# Patient Record
Sex: FEMALE | Race: WHITE
[De-identification: names, ages, dates, MRNs, and addresses within clinical notes are randomized per-mention and may not be internally consistent; named-entity substitution may affect disease eponyms.]

---

## 2021-10-14 ENCOUNTER — HOSPITAL ENCOUNTER (INPATIENT)
Dept: HOSPITAL 77 - KA.ED | Age: 51
LOS: 5 days | Discharge: HOME | DRG: 137 | End: 2021-10-19
Attending: INTERNAL MEDICINE | Admitting: PHYSICIAN ASSISTANT
Payer: COMMERCIAL

## 2021-10-14 DIAGNOSIS — U07.1: Primary | ICD-10-CM

## 2021-10-14 DIAGNOSIS — H54.7: ICD-10-CM

## 2021-10-14 DIAGNOSIS — Z90.49: ICD-10-CM

## 2021-10-14 DIAGNOSIS — J12.82: ICD-10-CM

## 2021-10-14 DIAGNOSIS — B17.8: ICD-10-CM

## 2021-10-14 DIAGNOSIS — R79.89: ICD-10-CM

## 2021-10-14 LAB
ANION GAP SERPL CALC-SCNC: 16.4 MMOL/L (ref 5–15)
CHLORIDE SERPL-SCNC: 99 MMOL/L (ref 98–107)
SODIUM SERPL-SCNC: 139 MMOL/L (ref 136–145)

## 2021-10-14 PROCEDURE — XW033E5 INTRODUCTION OF REMDESIVIR ANTI-INFECTIVE INTO PERIPHERAL VEIN, PERCUTANEOUS APPROACH, NEW TECHNOLOGY GROUP 5: ICD-10-PCS | Performed by: INTERNAL MEDICINE

## 2021-10-14 PROCEDURE — 8E0ZXY6 ISOLATION: ICD-10-PCS | Performed by: INTERNAL MEDICINE

## 2021-10-14 PROCEDURE — 3E0333Z INTRODUCTION OF ANTI-INFLAMMATORY INTO PERIPHERAL VEIN, PERCUTANEOUS APPROACH: ICD-10-PCS | Performed by: INTERNAL MEDICINE

## 2021-10-14 RX ADMIN — Medication SCH MG: at 17:17

## 2021-10-14 RX ADMIN — VITAMIN D, TAB 1000IU (100/BT) SCH MCG: 25 TAB at 17:17

## 2021-10-14 NOTE — CT
______________________________________________________________________________   

  

[id number] CT/CTA Chest  

EXAM: CT ANGIOGRAM CHEST  

   

 INDICATION: DYSPNEA.  

   

 COMPARISON: None.  

   

 DISCUSSION:   

 The pulmonary arteries are normal in appearance with no emboli identified.  

   

 There are extensive bilateral peripheral predominant groundglass opacities.  

 These are nonspecific, but could be seen in the context of infection including  

 COVID pneumonia. Scattered noncalcified pulmonary nodules the largest of which  

 is about 7 mm left upper lobe image 16 series 3.No pleural or pericardial  

 effusion. Normal heart size. No mediastinal, hilar or axillary lymphadenopathy.  

   

 Cholecystectomy.  

   

 IMPRESSION:    

 1.  Extensive bilateral groundglass infiltrates.  

 2.  Negative for pulmonary emboli.  

   

 Electronically signed by Derek Rainey MD on 10/14/2021 3:54 PM  

   

  

Derek Rainey MD                 

 10/14/21 [id number]    

  

Thank you for allowing us to participate in the care of your patient.

## 2021-10-14 NOTE — EDM.PDOC
ED HPI GENERAL MEDICAL PROBLEM





- General


Chief Complaint: General


Stated Complaint: COVID


Time Seen by Provider: 10/14/21 13:38


Source of Information: Reports: Patient


History Limitations: Reports: No Limitations





- History of Present Illness


INITIAL COMMENTS - FREE TEXT/NARRATIVE: 





50 YO WF PRESENTS TO ER COMPLAINING OF SHORTNESS OF BREATH WITH FEVER/CHILLS, 

BODY ACHES AND GENERALIZED WEAKNESS. PT REPORTS SHE BEGAN HAVING SYMPTOMS 

10/2/2021 AND WAS DIAGNOSED WITH COVID BY ANTIGEN TEST ON 10/5/2021. PT DENIES 

GETTING VACCINATED OR RECEIVING MONOCLONAL ANTIBODIES AS AN OUTPATIENT. PT 

REPORTS INTERMITTENT EPISODES OF N/V/D. PT STATES SHE FEELS WORSE OVER THE LAST 

COUPLE OF DAYS. PT HAD A TELEMEDICINE APPOINTMENT TODAY AN WAS INSTRUCTED TO 

COME TO ER FOR IVF THERAPY. SAO2 ON ARRIVAL WAS 88% RA,  


Onset Date: 10/02/21


Duration: Getting Worse


Location: Reports: Generalized


Quality: Reports: Ache


Severity: Moderate


Improves with: Reports: Medication


Worsens with: Reports: Movement


Associated Symptoms: Reports: No Other Symptoms, Cough, Fever/Chills, 

Nausea/Vomiting, Shortness of Breath, Weakness.  Denies: Chest Pain


  ** Face/Facial


Pain Score (Numeric/FACES): 10





- Related Data


                                    Allergies











Allergy/AdvReac Type Severity Reaction Status Date / Time


 


No Known Allergies Allergy   Verified 10/14/21 15:18











Home Meds: 


                                    Home Meds





Famotidine 20 mg PO DAILY 10/14/21 [History]











Past Medical History


HEENT History: Reports: Epistaxis, Impaired Vision, Sinusitis


Gastrointestinal History: Reports: None


Genitourinary History: Reports: None


OB/GYN History: Reports: Pregnancy


Other OB/GYN History: changing menses - feels is perimenopausal


Musculoskeletal History: Reports: Fracture


Other Musculoskeletal History: L ankle


Hematologic History: Reports: Anemia





- Infectious Disease History


Infectious Disease History: Reports: Chicken Pox





- Past Surgical History


HEENT Surgical History: Reports: Oral Surgery


GI Surgical History: Reports: Cholecystectomy


Other GI Surgeries/Procedures: 2008


Female  Surgical History: Reports:  Section





Social & Family History





- Tobacco Use


Tobacco Use Status *Q: Never Tobacco User





- Caffeine Use


Caffeine Use: Reports: None





- Recreational Drug Use


Recreational Drug Use: No





ED ROS GENERAL





- Review of Systems


Review Of Systems: See Below


Constitutional: Reports: Fever, Chills, Malaise, Weakness


HEENT: Reports: Rhinitis, Throat Pain


Respiratory: Reports: Shortness of Breath, Cough


Cardiovascular: Reports: No Symptoms


Endocrine: Reports: No Symptoms


GI/Abdominal: Reports: Diarrhea, Nausea, Vomiting.  Denies: Abdominal Pain


: Reports: No Symptoms


Musculoskeletal: Reports: Muscle Pain


Skin: Reports: No Symptoms


Neurological: Reports: No Symptoms


Psychiatric: Reports: No Symptoms


Hematologic/Lymphatic: Reports: No Symptoms


Immunologic: Reports: No Symptoms





ED EXAM, GENERAL





- Physical Exam


Exam: See Below


Exam Limited By: No Limitations


General Appearance: Alert, WD/WN, No Apparent Distress


Ears: Normal External Exam, Normal Canal, Hearing Grossly Normal, Normal TMs


Ear Exam: Bilateral Ear: Auricle Normal, Canal Normal, TM normal


Nose: Clear Rhinorrhea


Throat/Mouth: Normal Inspection, Normal Lips, Normal Teeth, Normal Gums, Normal 

Oropharynx, Normal Voice, No Airway Compromise


Head: Atraumatic, Normocephalic


Neck: Normal Inspection, Supple, Full Range of Motion, Lymphadenopathy (L), 

Lymphadenopathy (R), Tender Lateral


Respiratory/Chest: No Respiratory Distress, Lungs Clear, Normal Breath Sounds, 

No Accessory Muscle Use, Chest Non-Tender, Decreased Breath Sounds


Cardiovascular: Normal Peripheral Pulses, Regular Rate, Rhythm, No Edema, No 

Gallop, No JVD, No Murmur, No Rub


GI/Abdominal: Normal Bowel Sounds, Soft, Non-Tender, No Organomegaly, No 

Distention, No Abnormal Bruit, No Mass


Back Exam: Normal Inspection, Full Range of Motion, NT


Extremities: Normal Inspection, Normal Range of Motion, Non-Tender, Normal 

Capillary Refill, No Pedal Edema


Neurological: Alert, Oriented, CN II-XII Intact, Normal Cognition, Normal Gait, 

No Motor/Sensory Deficits


Psychiatric: Normal Affect, Normal Mood


Skin Exam: Warm, Dry, Intact, Normal Color, No Rash





Course





- Vital Signs


Last Recorded V/S: 


                                Last Vital Signs











Temp  97.5 F   10/14/21 15:18


 


Pulse  99   10/14/21 15:39


 


Resp  24 H  10/14/21 15:39


 


BP  123/85   10/14/21 15:39


 


Pulse Ox  92 L  10/14/21 15:39














- Orders/Labs/Meds


Orders: 


                               Active Orders 24 hr











 Category Date Time Status


 


 LIPASE [REF] Stat Lab  10/14/21 13:13 Ordered


 


 Sodium Chloride 0.9% [Normal Saline] 100 ml Med  10/14/21 15:00 Active





 IV ASDIRECTED   








                                Medication Orders





Sodium Chloride (Normal Saline)  100 mls @ 200 mls/hr IV ASDIRECTED EUGENE


   Last Admin: 10/14/21 15:42  Dose: 200 mls/hr


   Documented by: STEFANIA








Labs: 


                                Laboratory Tests











  10/14/21 10/14/21 10/14/21 Range/Units





  13:33 13:33 13:33 


 


WBC  7.34    (5.00-10.00)  10^3/uL


 


RBC  5.37    (3.80-5.50)  10^6/uL


 


Hgb  15.2    (12.0-16.0)  g/dL


 


Hct  47.1 H    (37.0-47.0)  %


 


MCV  87.7  D    (82.0-92.0)  fL


 


MCH  28.3    (27.0-31.0)  pg


 


MCHC  32.3    (32.0-36.0)  g/dL


 


RDW  14.4    (11.5-14.5)  %


 


Plt Count  227    (150-400)  10^3/uL


 


MPV  9.5    (7.4-10.4)  fL


 


Immature Gran % (Auto)  2.0    (0.0-5.0)  %


 


Neut % (Auto)  84.8 H    (50.0-70.0)  %


 


Lymph % (Auto)  7.8 L    (20.0-40.0)  %


 


Mono % (Auto)  5.4    (2.0-8.0)  %


 


Eos % (Auto)  0.0 L    (1.0-3.0)  %


 


Baso % (Auto)  0.0    (0.0-1.0)  %


 


Neut # (Auto)  6.22    (2.50-7.00)  10^3/uL


 


Lymph # (Auto)  0.57 L    (1.00-4.00)  10^3/uL


 


Mono # (Auto)  0.40    (0.10-0.80)  10^3/uL


 


Eos # (Auto)  0.00 L    (0.10-0.30)  10^3/uL


 


Baso # (Auto)  0.00    (0.00-0.10)  10^3/uL


 


Immature Gran # (Auto)  0.15    (0.00-0.50)  10^3/uL


 


D-Dimer, Quantitative     (<400)  ng/mL


 


Sodium   139   (136-145)  mmol/L


 


Potassium   3.4 L   (3.5-5.1)  mmol/L


 


Chloride   99   ()  mmol/L


 


Carbon Dioxide   27.0   (21.0-32.0)  mmol/L


 


Anion Gap   16.4 H   (5-15)  mmol/L


 


BUN   20 H   (7-18)  mg/dL


 


Creatinine   0.76   (0.51-1.17)  mg/dL


 


Est Cr Clr Drug Dosing   77.92   mL/min


 


Estimated GFR (MDRD)   > 60   mL/min


 


Glucose   99   ()  mg/dL


 


Lactic Acid    1.3  (0.4-2.0)  mmol/L


 


Calcium   8.4 L   (8.7-10.3)  mg/dL


 


Total Bilirubin   0.9   (0.2-1.0)  mg/dL


 


AST   119 H   (15-37)  U/L


 


ALT   118 H   (14-63)  U/L


 


Alkaline Phosphatase   210 H   ()  U/L


 


Total Protein   7.3   (6.4-8.2)  g/dL


 


Albumin   2.62 L   (3.40-5.00)  g/dL














  10/14/21 Range/Units





  13:33 


 


WBC   (5.00-10.00)  10^3/uL


 


RBC   (3.80-5.50)  10^6/uL


 


Hgb   (12.0-16.0)  g/dL


 


Hct   (37.0-47.0)  %


 


MCV   (82.0-92.0)  fL


 


MCH   (27.0-31.0)  pg


 


MCHC   (32.0-36.0)  g/dL


 


RDW   (11.5-14.5)  %


 


Plt Count   (150-400)  10^3/uL


 


MPV   (7.4-10.4)  fL


 


Immature Gran % (Auto)   (0.0-5.0)  %


 


Neut % (Auto)   (50.0-70.0)  %


 


Lymph % (Auto)   (20.0-40.0)  %


 


Mono % (Auto)   (2.0-8.0)  %


 


Eos % (Auto)   (1.0-3.0)  %


 


Baso % (Auto)   (0.0-1.0)  %


 


Neut # (Auto)   (2.50-7.00)  10^3/uL


 


Lymph # (Auto)   (1.00-4.00)  10^3/uL


 


Mono # (Auto)   (0.10-0.80)  10^3/uL


 


Eos # (Auto)   (0.10-0.30)  10^3/uL


 


Baso # (Auto)   (0.00-0.10)  10^3/uL


 


Immature Gran # (Auto)   (0.00-0.50)  10^3/uL


 


D-Dimer, Quantitative  1200 H  (<400)  ng/mL


 


Sodium   (136-145)  mmol/L


 


Potassium   (3.5-5.1)  mmol/L


 


Chloride   ()  mmol/L


 


Carbon Dioxide   (21.0-32.0)  mmol/L


 


Anion Gap   (5-15)  mmol/L


 


BUN   (7-18)  mg/dL


 


Creatinine   (0.51-1.17)  mg/dL


 


Est Cr Clr Drug Dosing   mL/min


 


Estimated GFR (MDRD)   mL/min


 


Glucose   ()  mg/dL


 


Lactic Acid   (0.4-2.0)  mmol/L


 


Calcium   (8.7-10.3)  mg/dL


 


Total Bilirubin   (0.2-1.0)  mg/dL


 


AST   (15-37)  U/L


 


ALT   (14-63)  U/L


 


Alkaline Phosphatase   ()  U/L


 


Total Protein   (6.4-8.2)  g/dL


 


Albumin   (3.40-5.00)  g/dL











Meds: 


Medications











Generic Name Dose Route Start Last Admin





  Trade Name Freq  PRN Reason Stop Dose Admin


 


Sodium Chloride  100 mls @ 200 mls/hr  10/14/21 15:00  10/14/21 15:42





  Normal Saline  IV   200 mls/hr





  ASDIRECTED EUGENE   Administration














Discontinued Medications














Generic Name Dose Route Start Last Admin





  Trade Name Freq  PRN Reason Stop Dose Admin


 


Dexamethasone  6 mg  10/14/21 13:14  10/14/21 13:48





  Dexamethasone 4 Mg/Ml Sdv  IVPUSH  10/14/21 13:15  6 mg





  ONETIME ONE   Administration


 


Sodium Chloride  1,000 mls @ 999 mls/hr  10/14/21 13:13  10/14/21 13:25





  Normal Saline  IV  10/14/21 14:13  999 mls/hr





  .BOLUS ONE   Administration


 


Iopamidol  75 ml  10/14/21 14:48  10/14/21 15:42





  Iopamidol 755 Mg/Ml 75 Ml Bottle  IVPUSH  10/14/21 14:49  75 ml





  ONETIME ONE   Administration


 


Ketorolac Tromethamine  30 mg  10/14/21 13:42  10/14/21 13:48





  Ketorolac 30 Mg/Ml Sdv  IVPUSH  10/14/21 13:43  30 mg





  ONETIME ONE   Administration


 


Ketorolac Tromethamine  Confirm  10/14/21 13:43  10/14/21 13:49





  Ketorolac 30 Mg/Ml Sdv  Administered  10/14/21 13:44  Not Given





  Dose  





  30 mg  





  .ROUTE  





  .[facility] ONE  














- Radiology Interpretation


Free Text/Narrative:: 





CXR- INTERSTITIAL INFILTRATES





CTA CHEST- NO PE; GROUND GLASS INFILTRATES CONSISTENT WITH COVID PNEUMONIA





Departure





- Departure


Time of Disposition: 16:15


Disposition: Admitted As Inpatient 65


Condition: Fair


Clinical Impression: 


 Pneumonia due to COVID-19 virus, Elevated LFTs, Hypoxemia, Viral syndrome








- Discharge Information


Referrals: 


Enedelia Jovel MD [Primary Care Provider] - 


Forms:  ED Department Discharge





Sepsis Event Note (ED)





- Evaluation


Sepsis Screening Result: No Definite Risk





- Focused Exam


Vital Signs: 


                                   Vital Signs











  Temp Pulse Resp BP Pulse Ox


 


 10/14/21 15:39   99  24 H  123/85  92 L


 


 10/14/21 15:18  97.5 F  96  24 H  106/79  93 L


 


 10/14/21 14:03  97.3 F  103 H  25 H  128/91 H  96


 


 10/14/21 13:00  97.5 F  101 H  22 H  116/85  85 L














- My Orders


Last 24 Hours: 


My Active Orders





10/14/21 13:13


LIPASE [REF] Stat 





10/14/21 15:00


Sodium Chloride 0.9% [Normal Saline] 100 ml IV ASDIRECTED 














- Assessment/Plan


Last 24 Hours: 


My Active Orders





10/14/21 13:13


LIPASE [REF] Stat 





10/14/21 15:00


Sodium Chloride 0.9% [Normal Saline] 100 ml IV ASDIRECTED 











Assessment:: 





1. COVID PNEUMONIA


2. HYPOEMIA


3. ELEVATED LFT'S


4. VIRAL SYNDROME





Plan: 





1. ADMIT TO MEDICINE- DR MANN ACCEPTED @7148


2. SUPPLEMENTAL O2


3. IVF


4. DECADRON 6MG IV DAILY

## 2021-10-14 NOTE — CR
______________________________________________________________________________   

  

[id number] RAD/RAD Chest PA or AP 1V  

EXAM: FRONTAL CHEST  

   

 INDICATION: DYSPNEA.  

   

 COMPARISON: None.  

   

 DISCUSSION: Bilateral patchy bilateral airspace edema and/or infiltrates.  

 Cardiomegaly with mild central vascular congestion. No effusions.  

   

 IMPRESSION:    

 1.  Patchy bilateral airspace edema and/or infiltrates.  

   

 Electronically signed by Derek Rainey MD on 10/14/2021 1:51 PM  

   

  

Derek Rainey MD                 

 10/14/21 5316    

  

Thank you for allowing us to participate in the care of your patient.

## 2021-10-15 LAB
ANION GAP SERPL CALC-SCNC: 15.9 MMOL/L (ref 5–15)
CHLORIDE SERPL-SCNC: 108 MMOL/L (ref 98–107)
SODIUM SERPL-SCNC: 143 MMOL/L (ref 136–145)

## 2021-10-15 PROCEDURE — 3E0333Z INTRODUCTION OF ANTI-INFLAMMATORY INTO PERIPHERAL VEIN, PERCUTANEOUS APPROACH: ICD-10-PCS | Performed by: INTERNAL MEDICINE

## 2021-10-15 RX ADMIN — DEXAMETHASONE SODIUM PHOSPHATE SCH MG: 10 INJECTION INTRAMUSCULAR; INTRAVENOUS at 08:30

## 2021-10-15 RX ADMIN — Medication SCH MG: at 08:30

## 2021-10-15 RX ADMIN — ALUMINUM HYDROXIDE, MAGNESIUM HYDROXIDE, AND SIMETHICONE PRN ML: 200; 200; 20 SUSPENSION ORAL at 01:20

## 2021-10-15 RX ADMIN — VITAMIN D, TAB 1000IU (100/BT) SCH MCG: 25 TAB at 08:30

## 2021-10-15 NOTE — PCM.PN
- General Info


Date of Service: 10/15/21


Admission Dx/Problem (Free Text): 





Paulina was admitted from the emergency department secondary of hypoxemia with 

associated COVID-19 diagnosis.


Chest x-ray showed congestive pattern suggestive of her COVID-19 diagnosis/viral

pneumonia.


She had elevated liver enzymes on her emergency department work-up with no 

elevation of her white blood cell count.





Elevated D-dimer with PE study performed ruling out pulmonary embolus.





She has had associated symptoms since 2 October with a confirmed diagnosis 5 October 2021.


Functional Status: Reports: Pain Controlled





- Review of Systems


General: Reports: No Symptoms


HEENT: Reports: No Symptoms


Pulmonary: Reports: Shortness of Breath, Cough.  Denies: Sputum


Cardiovascular: Reports: No Symptoms


Gastrointestinal: Reports: No Symptoms


Genitourinary: Reports: No Symptoms


Musculoskeletal: Reports: Neck Pain, Shoulder Pain, Back Pain, Leg Pain, Joint 

Pain, Other (Neck pain)


Skin: Reports: No Symptoms


Neurological: Reports: No Symptoms


Psychiatric: Reports: No Symptoms





- Patient Data


Vitals - Most Recent: 


                                Last Vital Signs











Temp  97.8 F   10/15/21 11:00


 


Pulse  76   10/15/21 11:00


 


Resp  24 H  10/15/21 11:00


 


BP  117/75   10/15/21 11:00


 


Pulse Ox  87 L  10/15/21 11:00











Weight - Most Recent: 180 lb


I&O - Last 24 Hours: 


                                 Intake & Output











 10/14/21 10/15/21 10/15/21





 22:59 06:59 14:59


 


Intake Total 1130 1954 


 


Balance 1130 1954 











Imaging Impressions - Last 24 Hours: 


Viral pneumonia pattern


Lab Results Last 24 Hours: 


                         Laboratory Results - last 24 hr











  10/14/21 10/14/21 10/14/21 Range/Units





  13:33 13:33 13:33 


 


WBC  7.34    (5.00-10.00)  10^3/uL


 


RBC  5.37    (3.80-5.50)  10^6/uL


 


Hgb  15.2    (12.0-16.0)  g/dL


 


Hct  47.1 H    (37.0-47.0)  %


 


MCV  87.7  D    (82.0-92.0)  fL


 


MCH  28.3    (27.0-31.0)  pg


 


MCHC  32.3    (32.0-36.0)  g/dL


 


RDW  14.4    (11.5-14.5)  %


 


Plt Count  227    (150-400)  10^3/uL


 


MPV  9.5    (7.4-10.4)  fL


 


Immature Gran % (Auto)  2.0    (0.0-5.0)  %


 


Neut % (Auto)  84.8 H    (50.0-70.0)  %


 


Lymph % (Auto)  7.8 L    (20.0-40.0)  %


 


Mono % (Auto)  5.4    (2.0-8.0)  %


 


Eos % (Auto)  0.0 L    (1.0-3.0)  %


 


Baso % (Auto)  0.0    (0.0-1.0)  %


 


Neut # (Auto)  6.22    (2.50-7.00)  10^3/uL


 


Lymph # (Auto)  0.57 L    (1.00-4.00)  10^3/uL


 


Mono # (Auto)  0.40    (0.10-0.80)  10^3/uL


 


Eos # (Auto)  0.00 L    (0.10-0.30)  10^3/uL


 


Baso # (Auto)  0.00    (0.00-0.10)  10^3/uL


 


Immature Gran # (Auto)  0.15    (0.00-0.50)  10^3/uL


 


D-Dimer, Quantitative     (<400)  ng/mL


 


Sodium   139   (136-145)  mmol/L


 


Potassium   3.4 L   (3.5-5.1)  mmol/L


 


Chloride   99   ()  mmol/L


 


Carbon Dioxide   27.0   (21.0-32.0)  mmol/L


 


Anion Gap   16.4 H   (5-15)  mmol/L


 


BUN   20 H   (7-18)  mg/dL


 


Creatinine   0.76   (0.51-1.17)  mg/dL


 


Est Cr Clr Drug Dosing   77.92   mL/min


 


Estimated GFR (MDRD)   > 60   mL/min


 


Glucose   99   ()  mg/dL


 


Lactic Acid    1.3  (0.4-2.0)  mmol/L


 


Calcium   8.4 L   (8.7-10.3)  mg/dL


 


Total Bilirubin   0.9   (0.2-1.0)  mg/dL


 


AST   119 H   (15-37)  U/L


 


ALT   118 H   (14-63)  U/L


 


Alkaline Phosphatase   210 H   ()  U/L


 


Total Protein   7.3   (6.4-8.2)  g/dL


 


Albumin   2.62 L   (3.40-5.00)  g/dL














  10/14/21 10/15/21 Range/Units





  13:33 08:55 


 


WBC    (5.00-10.00)  10^3/uL


 


RBC    (3.80-5.50)  10^6/uL


 


Hgb    (12.0-16.0)  g/dL


 


Hct    (37.0-47.0)  %


 


MCV    (82.0-92.0)  fL


 


MCH    (27.0-31.0)  pg


 


MCHC    (32.0-36.0)  g/dL


 


RDW    (11.5-14.5)  %


 


Plt Count    (150-400)  10^3/uL


 


MPV    (7.4-10.4)  fL


 


Immature Gran % (Auto)    (0.0-5.0)  %


 


Neut % (Auto)    (50.0-70.0)  %


 


Lymph % (Auto)    (20.0-40.0)  %


 


Mono % (Auto)    (2.0-8.0)  %


 


Eos % (Auto)    (1.0-3.0)  %


 


Baso % (Auto)    (0.0-1.0)  %


 


Neut # (Auto)    (2.50-7.00)  10^3/uL


 


Lymph # (Auto)    (1.00-4.00)  10^3/uL


 


Mono # (Auto)    (0.10-0.80)  10^3/uL


 


Eos # (Auto)    (0.10-0.30)  10^3/uL


 


Baso # (Auto)    (0.00-0.10)  10^3/uL


 


Immature Gran # (Auto)    (0.00-0.50)  10^3/uL


 


D-Dimer, Quantitative  1200 H   (<400)  ng/mL


 


Sodium   143  (136-145)  mmol/L


 


Potassium   3.7  (3.5-5.1)  mmol/L


 


Chloride   108 H  ()  mmol/L


 


Carbon Dioxide   22.8  (21.0-32.0)  mmol/L


 


Anion Gap   15.9 H  (5-15)  mmol/L


 


BUN   17  (7-18)  mg/dL


 


Creatinine   0.56  (0.51-1.17)  mg/dL


 


Est Cr Clr Drug Dosing   105.74  mL/min


 


Estimated GFR (MDRD)   > 60  mL/min


 


Glucose   124  ()  mg/dL


 


Lactic Acid    (0.4-2.0)  mmol/L


 


Calcium   7.7 L  (8.7-10.3)  mg/dL


 


Total Bilirubin   0.6  (0.2-1.0)  mg/dL


 


AST   117 H  (15-37)  U/L


 


ALT   123 H  (14-63)  U/L


 


Alkaline Phosphatase   196 H  ()  U/L


 


Total Protein   6.5  (6.4-8.2)  g/dL


 


Albumin   2.28 L  (3.40-5.00)  g/dL











Med Orders - Current: 


                               Current Medications





Acetaminophen (Acetaminophen 325 Mg Tab)  650 mg PO Q4H PRN


   PRN Reason: Pain (Mild 1-3)/fever


   Last Admin: 10/14/21 19:46 Dose:  650 mg


   Documented by: 


Al Hydroxide/Mg Hydroxide (Aluminum Hydroxide/Magnesium Hydroxide/Simethicone 

Susp 30 Ml Cup)  30 ml PO Q6H PRN


   PRN Reason: Heartburn


   Last Admin: 10/15/21 01:20 Dose:  30 ml


   Documented by: 


Albuterol/Ipratropium (Albuterol/Ipratropium 3.0-0.5 Mg/3 Ml Neb Soln)  3 ml NEB

Q4H PRN


   PRN Reason: Shortness Of Breath/wheezing


Ascorbic Acid (Ascorbic Acid 500 Mg Tab)  1,000 mg PO BID [facility]


   Last Admin: 10/15/21 08:30 Dose:  1,000 mg


   Documented by: 


Cholecalciferol (Cholecalciferol (Vitamin D3) 25 Mcg Tab)  25 mcg PO DAILY [facility]


   Last Admin: 10/15/21 08:30 Dose:  25 mcg


   Documented by: 


Dexamethasone (Dexamethasone 10 Mg/Ml Sdv)  6 mg IVPUSH DAILY [facility]


   Last Admin: 10/15/21 08:30 Dose:  6 mg


   Documented by: 


Enoxaparin Sodium (Enoxaparin 40 Mg/0.4 Ml Syringe)  40 mg SUBCUT Q12H [facility]


Sodium Chloride (Normal Saline)  1,000 mls @ 125 mls/hr IV ASDIRECTED [facility]


   Last Admin: 10/15/21 09:32 Dose:  125 mls/hr


   Documented by: 


Remdesivir 100 mg/ Sodium (Chloride)  250 mls @ 250 mls/hr IV Q24H [facility]


Ibuprofen (Ibuprofen 600 Mg Tab)  600 mg PO Q6H PRN


   PRN Reason: Pain (mild 1-3)


Morphine Sulfate (Morphine 2 Mg/Ml Syringe)  2 mg IVPUSH Q2H PRN


   PRN Reason: Pain (severe 7-10)


Sodium Chloride (Sodium Chloride 0.9% 10 Ml Syringe)  10 ml FLUSH Q8HR PRN


   PRN Reason: keep vein open


Zinc Gluconate (Zinc (Zinc Gluconate) 50 Mg Tab)  50 mg PO DAILY [facility]


   Last Admin: 10/15/21 08:30 Dose:  50 mg


   Documented by: 





Discontinued Medications





Dexamethasone (Dexamethasone 4 Mg/Ml Sdv)  6 mg IVPUSH ONETIME ONE


   Stop: 10/14/21 13:15


   Last Admin: 10/14/21 13:48 Dose:  6 mg


   Documented by: 


Dexamethasone (Dexamethasone 10 Mg/Ml Sdv)  6 mg IVPUSH ONETIME ONE


   Stop: 10/15/21 16:27


Sodium Chloride (Normal Saline)  1,000 mls @ 999 mls/hr IV .BOLUS ONE


   Stop: 10/14/21 14:13


   Last Admin: 10/14/21 13:25 Dose:  999 mls/hr


   Documented by: 


Sodium Chloride (Normal Saline)  100 mls @ 200 mls/hr IV ASDIRECTED [facility]


   Last Admin: 10/14/21 15:42 Dose:  200 mls/hr


   Documented by: 


Remdesivir 200 mg/ Sodium (Chloride)  250 mls @ 250 mls/hr IV ONETIME ONE


   Stop: 10/14/21 16:27


   Last Admin: 10/14/21 17:14 Dose:  250 mls/hr


   Documented by: 


Iopamidol (Iopamidol 755 Mg/Ml 75 Ml Bottle)  75 ml IVPUSH ONETIME ONE


   Stop: 10/14/21 14:49


   Last Admin: 10/14/21 15:42 Dose:  75 ml


   Documented by: 


Ketorolac Tromethamine (Ketorolac 30 Mg/Ml Sdv)  30 mg IVPUSH ONETIME ONE


   Stop: 10/14/21 13:43


   Last Admin: 10/14/21 13:48 Dose:  30 mg


   Documented by: 


Ketorolac Tromethamine (Ketorolac 30 Mg/Ml Sdv) Confirm Administered Dose 30 mg 

.ROUTE .STK-MED ONE


   Stop: 10/14/21 13:44


   Last Admin: 10/14/21 13:49 Dose:  Not Given


   Documented by: 











- Exam


Quality Assessment: Supplemental Oxygen


General: Alert, Oriented, Cooperative


HEENT: Pupils Equal, Pupils Reactive, Mucous Membr. Moist/Pink


Neck: Supple, Trachea Midline, No JVD, Lymphadenopathy (Anterior with 

tenderness)


Lungs: Clear to Auscultation, Decreased Breath Sounds (At the bases bilateral.),

Rhonchi (Base).  No: Crackles, Rales


Cardiovascular: Regular Rate, Regular Rhythm


GI/Abdominal Exam: Normal Bowel Sounds, Soft, Non-Tender, No Distention


 (Female) Exam: Deferred


Back Exam: Normal Inspection


Extremities: Normal Inspection, Normal Range of Motion, Non-Tender (+1 pedal 

edema)


Skin: Warm, Dry, Intact


Neurological: No New Focal Deficit


Psy/Mental Status: Alert, Normal Affect, Normal Mood


Physical Findings Comments:: 


She appears ill with no cyanosis nor pallor.


She has been up ambulating in the room with some attributed shortness of breath 

and the requirement for supplemental oxygen continues. She has been as high as 5

L at this time on 3 L with saturation in the low 90s.





She received infusion of remdesivir yesterday as well as Decadron and today we 

will continue that with IV fluid of normal saline at 125 mL's per hour.


We will implement Lovenox 40 mg twice daily subcutaneously secondary of her 

elevated D-dimer, as well as more bedrest time while hospitalized ambulating in 

the room as able.





- Patient Data


Lab Results Last 24 hrs: 


                         Laboratory Results - last 24 hr











  10/14/21 10/14/21 10/14/21 Range/Units





  13:33 13:33 13:33 


 


WBC  7.34    (5.00-10.00)  10^3/uL


 


RBC  5.37    (3.80-5.50)  10^6/uL


 


Hgb  15.2    (12.0-16.0)  g/dL


 


Hct  47.1 H    (37.0-47.0)  %


 


MCV  87.7  D    (82.0-92.0)  fL


 


MCH  28.3    (27.0-31.0)  pg


 


MCHC  32.3    (32.0-36.0)  g/dL


 


RDW  14.4    (11.5-14.5)  %


 


Plt Count  227    (150-400)  10^3/uL


 


MPV  9.5    (7.4-10.4)  fL


 


Immature Gran % (Auto)  2.0    (0.0-5.0)  %


 


Neut % (Auto)  84.8 H    (50.0-70.0)  %


 


Lymph % (Auto)  7.8 L    (20.0-40.0)  %


 


Mono % (Auto)  5.4    (2.0-8.0)  %


 


Eos % (Auto)  0.0 L    (1.0-3.0)  %


 


Baso % (Auto)  0.0    (0.0-1.0)  %


 


Neut # (Auto)  6.22    (2.50-7.00)  10^3/uL


 


Lymph # (Auto)  0.57 L    (1.00-4.00)  10^3/uL


 


Mono # (Auto)  0.40    (0.10-0.80)  10^3/uL


 


Eos # (Auto)  0.00 L    (0.10-0.30)  10^3/uL


 


Baso # (Auto)  0.00    (0.00-0.10)  10^3/uL


 


Immature Gran # (Auto)  0.15    (0.00-0.50)  10^3/uL


 


D-Dimer, Quantitative     (<400)  ng/mL


 


Sodium   139   (136-145)  mmol/L


 


Potassium   3.4 L   (3.5-5.1)  mmol/L


 


Chloride   99   ()  mmol/L


 


Carbon Dioxide   27.0   (21.0-32.0)  mmol/L


 


Anion Gap   16.4 H   (5-15)  mmol/L


 


BUN   20 H   (7-18)  mg/dL


 


Creatinine   0.76   (0.51-1.17)  mg/dL


 


Est Cr Clr Drug Dosing   77.92   mL/min


 


Estimated GFR (MDRD)   > 60   mL/min


 


Glucose   99   ()  mg/dL


 


Lactic Acid    1.3  (0.4-2.0)  mmol/L


 


Calcium   8.4 L   (8.7-10.3)  mg/dL


 


Total Bilirubin   0.9   (0.2-1.0)  mg/dL


 


AST   119 H   (15-37)  U/L


 


ALT   118 H   (14-63)  U/L


 


Alkaline Phosphatase   210 H   ()  U/L


 


Total Protein   7.3   (6.4-8.2)  g/dL


 


Albumin   2.62 L   (3.40-5.00)  g/dL














  10/14/21 10/15/21 Range/Units





  13:33 08:55 


 


WBC    (5.00-10.00)  10^3/uL


 


RBC    (3.80-5.50)  10^6/uL


 


Hgb    (12.0-16.0)  g/dL


 


Hct    (37.0-47.0)  %


 


MCV    (82.0-92.0)  fL


 


MCH    (27.0-31.0)  pg


 


MCHC    (32.0-36.0)  g/dL


 


RDW    (11.5-14.5)  %


 


Plt Count    (150-400)  10^3/uL


 


MPV    (7.4-10.4)  fL


 


Immature Gran % (Auto)    (0.0-5.0)  %


 


Neut % (Auto)    (50.0-70.0)  %


 


Lymph % (Auto)    (20.0-40.0)  %


 


Mono % (Auto)    (2.0-8.0)  %


 


Eos % (Auto)    (1.0-3.0)  %


 


Baso % (Auto)    (0.0-1.0)  %


 


Neut # (Auto)    (2.50-7.00)  10^3/uL


 


Lymph # (Auto)    (1.00-4.00)  10^3/uL


 


Mono # (Auto)    (0.10-0.80)  10^3/uL


 


Eos # (Auto)    (0.10-0.30)  10^3/uL


 


Baso # (Auto)    (0.00-0.10)  10^3/uL


 


Immature Gran # (Auto)    (0.00-0.50)  10^3/uL


 


D-Dimer, Quantitative  1200 H   (<400)  ng/mL


 


Sodium   143  (136-145)  mmol/L


 


Potassium   3.7  (3.5-5.1)  mmol/L


 


Chloride   108 H  ()  mmol/L


 


Carbon Dioxide   22.8  (21.0-32.0)  mmol/L


 


Anion Gap   15.9 H  (5-15)  mmol/L


 


BUN   17  (7-18)  mg/dL


 


Creatinine   0.56  (0.51-1.17)  mg/dL


 


Est Cr Clr Drug Dosing   105.74  mL/min


 


Estimated GFR (MDRD)   > 60  mL/min


 


Glucose   124  ()  mg/dL


 


Lactic Acid    (0.4-2.0)  mmol/L


 


Calcium   7.7 L  (8.7-10.3)  mg/dL


 


Total Bilirubin   0.6  (0.2-1.0)  mg/dL


 


AST   117 H  (15-37)  U/L


 


ALT   123 H  (14-63)  U/L


 


Alkaline Phosphatase   196 H  ()  U/L


 


Total Protein   6.5  (6.4-8.2)  g/dL


 


Albumin   2.28 L  (3.40-5.00)  g/dL











Result Diagrams: 


                                 10/14/21 13:33





                                 10/15/21 08:55





Sepsis Event Note





- Evaluation


Sepsis Screening Result: No Definite Risk





- Focused Exam


Vital Signs: 


                                   Vital Signs











  Temp Pulse Resp BP Pulse Ox


 


 10/15/21 11:00  97.8 F  76  24 H  117/75  87 L


 


 10/15/21 06:00  97.8 F  90  24 H  123/84  87 L


 


 10/15/21 02:51      93 L


 


 10/15/21 02:02      92 L


 


 10/15/21 01:37  97.6 F  69  20  137/92 H  90 L














- Problem List & Annotations


(1) Elevated d-dimer


SNOMED Code(s): 663103363


   Code(s): R79.89 - OTHER SPECIFIED ABNORMAL FINDINGS OF BLOOD CHEMISTRY   

Status: Acute   Priority: High   Current Visit: Yes   





(2) Elevated LFTs


SNOMED Code(s): 939742185


   Code(s): R79.89 - OTHER SPECIFIED ABNORMAL FINDINGS OF BLOOD CHEMISTRY   

Status: Acute   Priority: High   Current Visit: Yes   





(3) Hypoxemia


SNOMED Code(s): 020259347


   Code(s): R09.02 - HYPOXEMIA   Status: Acute   Priority: High   Current Visit:

Yes   





(4) Pneumonia due to COVID-19 virus


SNOMED Code(s): 826465437512597138


   Code(s): U07.1 - COVID-19; J12.82 - PNEUMONIA DUE TO CORONAVIRUS DISEASE 2019

  Status: Acute   Priority: High   Current Visit: Yes   





(5) Viral syndrome


SNOMED Code(s): 84578291


   Code(s): B34.9 - VIRAL INFECTION, UNSPECIFIED   Status: Acute   Priority: 

High   Current Visit: Yes   





- Problem List Review


Problem List Initiated/Reviewed/Updated: Yes





- My Orders


Last 24 Hours: 


My Active Orders





10/15/21 11:45


Enoxaparin [Lovenox]   40 mg SUBCUT Q12H 





10/16/21 05:11


CBC WITH AUTO DIFF [HEME] DAILY 


COMPREHENSIVE METABOLIC PN,CMP [CHEM] AM 





10/17/21 05:11


CBC WITH AUTO DIFF [HEME] DAILY 


COMPREHENSIVE METABOLIC PN,CMP [CHEM] AM 





10/18/21 05:11


CBC WITH AUTO DIFF [HEME] DAILY 


COMPREHENSIVE METABOLIC PN,CMP [CHEM] AM 





10/19/21 05:11


CBC WITH AUTO DIFF [HEME] DAILY 


COMPREHENSIVE METABOLIC PN,CMP [CHEM] AM 





10/20/21 05:11


CBC WITH AUTO DIFF [HEME] DAILY 


COMPREHENSIVE METABOLIC PN,CMP [CHEM] AM 














- Assessment


Assessment:: 





COVID-19 pneumonia.


Hypoxemia


Elevated D-dimer.


Elevated LFTs.








- Plan


Plan:: 





She received infusion of remdesivir yesterday as well as Decadron and today we 

will continue that with IV fluid of normal saline at 125 mL's per hour.


We will implement Lovenox 40 mg twice daily subcutaneously secondary of her 

elevated D-dimer, as well as more bedrest time while hospitalized ambulating in 

the room as able.





We will continue to provide supplemental oxygen as needed maintaining good 

saturation 92% on average.





Lengthy discussion on the aspects that there is no in stone recovery nor 

treatment for COVID-19. She asked for an honest opinion to which I expressed she

 will either get over this, improving and being discharged home in the next few 

days or will be getting worse and consideration for longer hospitalization and 

even potential transfer to a higher tertiary center for more advanced treatment 

as sometimes is needed.





Both her and her  understand that each individual is on a case-by-case 

basis for treatment as well as the recovery as he was positive at roughly the 

same time as per and as feeling well and having no concerns at this time.





Continued for the aforementioned plan and current orders with adjustments to be 

made on a daily basis.

## 2021-10-16 LAB
ANION GAP SERPL CALC-SCNC: 15.1 MMOL/L (ref 5–15)
CHLORIDE SERPL-SCNC: 111 MMOL/L (ref 98–107)
SODIUM SERPL-SCNC: 143 MMOL/L (ref 136–145)

## 2021-10-16 RX ADMIN — ALUMINUM HYDROXIDE, MAGNESIUM HYDROXIDE, AND SIMETHICONE PRN ML: 200; 200; 20 SUSPENSION ORAL at 20:52

## 2021-10-16 RX ADMIN — CARBOXYMETHYLCELLULOSE SODIUM PRN DROP: 5 SOLUTION/ DROPS OPHTHALMIC at 17:15

## 2021-10-16 RX ADMIN — Medication SCH MG: at 08:00

## 2021-10-16 RX ADMIN — VITAMIN D, TAB 1000IU (100/BT) SCH MCG: 25 TAB at 08:00

## 2021-10-16 RX ADMIN — DEXAMETHASONE SODIUM PHOSPHATE SCH MG: 10 INJECTION INTRAMUSCULAR; INTRAVENOUS at 08:00

## 2021-10-16 NOTE — PCM.PN
- General Info


Date of Service: 10/16/21


Subjective Update: 





PT REPORTS FEELING BETTER TODAY. PT SITTING IN CHAIR AT TIME OF EXAM. PT REPORTS

MILDLY IMPROVED SHORTNESS OF BREATH. PT DENIES ANY AIR HUNGER. 


Functional Status: Reports: Tolerating Diet, Ambulating, Incentive Spirometry





- Review of Systems


General: Reports: No Symptoms


HEENT: Reports: Rhinitis


Pulmonary: Reports: Shortness of Breath


Cardiovascular: Reports: No Symptoms


Gastrointestinal: Reports: No Symptoms


Genitourinary: Reports: No Symptoms


Musculoskeletal: Reports: No Symptoms


Skin: Reports: No Symptoms


Neurological: Reports: No Symptoms


Psychiatric: Reports: No Symptoms





- Patient Data


Vitals - Most Recent: 


                                Last Vital Signs











Temp  97.6 F   10/16/21 07:58


 


Pulse  81   10/16/21 06:40


 


Resp  22 H  10/16/21 06:40


 


BP  122/80   10/16/21 06:40


 


Pulse Ox  94 L  10/16/21 09:00











Weight - Most Recent: 180 lb


I&O - Last 24 Hours: 


                                 Intake & Output











 10/15/21 10/16/21 10/16/21





 22:59 06:59 14:59


 


Intake Total 3559 1659 


 


Output Total 400 700 


 


Balance 3159 959 











Lab Results Last 24 Hours: 


                         Laboratory Results - last 24 hr











  10/16/21 10/16/21 Range/Units





  10:00 10:00 


 


WBC   11.07 H  (5.00-10.00)  10^3/uL


 


RBC   4.89  (3.80-5.50)  10^6/uL


 


Hgb   14.0  (12.0-16.0)  g/dL


 


Hct   43.1  (37.0-47.0)  %


 


MCV   88.1  (82.0-92.0)  fL


 


MCH   28.6  (27.0-31.0)  pg


 


MCHC   32.5  (32.0-36.0)  g/dL


 


RDW   14.6 H  (11.5-14.5)  %


 


Plt Count   309  D  (150-400)  10^3/uL


 


MPV   9.4  (7.4-10.4)  fL


 


Immature Gran % (Auto)   2.3  (0.0-5.0)  %


 


Neut % (Auto)   86.5 H  (50.0-70.0)  %


 


Lymph % (Auto)   5.1 L  (20.0-40.0)  %


 


Mono % (Auto)   6.0  (2.0-8.0)  %


 


Eos % (Auto)   0.0 L  (1.0-3.0)  %


 


Baso % (Auto)   0.1  (0.0-1.0)  %


 


Neut # (Auto)   9.58 H  (2.50-7.00)  10^3/uL


 


Lymph # (Auto)   0.56 L  (1.00-4.00)  10^3/uL


 


Mono # (Auto)   0.66  (0.10-0.80)  10^3/uL


 


Eos # (Auto)   0.00 L  (0.10-0.30)  10^3/uL


 


Baso # (Auto)   0.01  (0.00-0.10)  10^3/uL


 


Immature Gran # (Auto)   0.26  (0.00-0.50)  10^3/uL


 


Sodium  143   (136-145)  mmol/L


 


Potassium  3.5   (3.5-5.1)  mmol/L


 


Chloride  111 H   ()  mmol/L


 


Carbon Dioxide  20.4 L   (21.0-32.0)  mmol/L


 


Anion Gap  15.1 H   (5-15)  mmol/L


 


BUN  19 H   (7-18)  mg/dL


 


Creatinine  0.60   (0.51-1.17)  mg/dL


 


Est Cr Clr Drug Dosing  98.69   mL/min


 


Estimated GFR (MDRD)  > 60   mL/min


 


Glucose  149 H   ()  mg/dL


 


Calcium  7.4 L   (8.7-10.3)  mg/dL


 


Total Bilirubin  0.6   (0.2-1.0)  mg/dL


 


AST  94 H   (15-37)  U/L


 


ALT  121 H   (14-63)  U/L


 


Alkaline Phosphatase  184 H   ()  U/L


 


Total Protein  6.1 L   (6.4-8.2)  g/dL


 


Albumin  2.29 L   (3.40-5.00)  g/dL











Med Orders - Current: 


                               Current Medications





Acetaminophen (Acetaminophen 325 Mg Tab)  650 mg PO Q4H PRN


   PRN Reason: Pain (Mild 1-3)/fever


   Last Admin: 10/14/21 19:46 Dose:  650 mg


   Documented by: 


Al Hydroxide/Mg Hydroxide (Aluminum Hydroxide/Magnesium Hydroxide/Simethicone 

Susp 30 Ml Cup)  30 ml PO Q6H PRN


   PRN Reason: Heartburn


   Last Admin: 10/15/21 01:20 Dose:  30 ml


   Documented by: 


Albuterol/Ipratropium (Albuterol/Ipratropium 3.0-0.5 Mg/3 Ml Neb Soln)  3 ml NEB

Q4H PRN


   PRN Reason: Shortness Of Breath/wheezing


Ascorbic Acid (Ascorbic Acid 500 Mg Tab)  1,000 mg PO BID [facility]


   Last Admin: 10/16/21 08:06 Dose:  1,000 mg


   Documented by: 


Cholecalciferol (Cholecalciferol (Vitamin D3) 25 Mcg Tab)  25 mcg PO DAILY [facility]


   Last Admin: 10/16/21 08:00 Dose:  25 mcg


   Documented by: 


Dexamethasone (Dexamethasone 10 Mg/Ml Sdv)  6 mg IVPUSH DAILY [facility]


   Last Admin: 10/16/21 08:00 Dose:  6 mg


   Documented by: 


Enoxaparin Sodium (Enoxaparin 40 Mg/0.4 Ml Syringe)  40 mg SUBCUT Q12H [facility]


   Last Admin: 10/15/21 22:56 Dose:  Not Given


   Documented by: 


Remdesivir 100 mg/ Sodium (Chloride)  250 mls @ 250 mls/hr IV Q24H [facility]


   Last Admin: 10/15/21 16:10 Dose:  250 mls/hr


   Documented by: 


Ibuprofen (Ibuprofen 600 Mg Tab)  600 mg PO Q6H PRN


   PRN Reason: Pain (mild 1-3)


   Last Admin: 10/16/21 01:14 Dose:  600 mg


   Documented by: 


Morphine Sulfate (Morphine 2 Mg/Ml Syringe)  2 mg IVPUSH Q2H PRN


   PRN Reason: Pain (severe 7-10)


Sodium Chloride (Sodium Chloride 0.9% 10 Ml Syringe)  10 ml FLUSH Q8HR PRN


   PRN Reason: keep vein open


Zinc Gluconate (Zinc (Zinc Gluconate) 50 Mg Tab)  50 mg PO DAILY [facility]


   Last Admin: 10/16/21 08:00 Dose:  50 mg


   Documented by: 





Discontinued Medications





Dexamethasone (Dexamethasone 4 Mg/Ml Sdv)  6 mg IVPUSH ONETIME ONE


   Stop: 10/14/21 13:15


   Last Admin: 10/14/21 13:48 Dose:  6 mg


   Documented by: 


Dexamethasone (Dexamethasone 10 Mg/Ml Sdv)  6 mg IVPUSH ONETIME ONE


   Stop: 10/15/21 16:27


Sodium Chloride (Normal Saline)  1,000 mls @ 999 mls/hr IV .BOLUS ONE


   Stop: 10/14/21 14:13


   Last Admin: 10/14/21 13:25 Dose:  999 mls/hr


   Documented by: 


Sodium Chloride (Normal Saline)  100 mls @ 200 mls/hr IV ASDIRECTED [facility]


   Last Admin: 10/14/21 15:42 Dose:  200 mls/hr


   Documented by: 


Sodium Chloride (Normal Saline)  1,000 mls @ 125 mls/hr IV ASDIRECTED [facility]


   Last Admin: 10/16/21 03:03 Dose:  125 mls/hr


   Documented by: 


Remdesivir 200 mg/ Sodium (Chloride)  250 mls @ 250 mls/hr IV ONETIME ONE


   Stop: 10/14/21 16:27


   Last Admin: 10/14/21 17:14 Dose:  250 mls/hr


   Documented by: 


Iopamidol (Iopamidol 755 Mg/Ml 75 Ml Bottle)  75 ml IVPUSH ONETIME ONE


   Stop: 10/14/21 14:49


   Last Admin: 10/14/21 15:42 Dose:  75 ml


   Documented by: 


Ketorolac Tromethamine (Ketorolac 30 Mg/Ml Sdv)  30 mg IVPUSH ONETIME ONE


   Stop: 10/14/21 13:43


   Last Admin: 10/14/21 13:48 Dose:  30 mg


   Documented by: 


Ketorolac Tromethamine (Ketorolac 30 Mg/Ml Sdv) Confirm Administered Dose 30 mg 

.ROUTE .STK-MED ONE


   Stop: 10/14/21 13:44


   Last Admin: 10/14/21 13:49 Dose:  Not Given


   Documented by: 











- Exam


Quality Assessment: Supplemental Oxygen


General: Alert, Oriented


HEENT: Pupils Equal, Pupils Reactive, EOMI, Mucous Membr. Moist/Pink


Neck: Supple


Lungs: Normal Respiratory Effort, Decreased Breath Sounds


Cardiovascular: Regular Rate, Regular Rhythm


GI/Abdominal Exam: Normal Bowel Sounds, Soft, Non-Tender, No Organomegaly, No 

Distention, No Abnormal Bruit, No Mass, Pelvis Stable


Back Exam: Normal Inspection, Full Range of Motion


Extremities: Normal Inspection, Normal Range of Motion, Non-Tender, No Pedal 

Edema, Normal Capillary Refill


Skin: Warm, Dry, Intact


Neurological: No New Focal Deficit


Psy/Mental Status: Alert, Normal Affect, Normal Mood





- Patient Data


Lab Results Last 24 hrs: 


                         Laboratory Results - last 24 hr











  10/16/21 10/16/21 Range/Units





  10:00 10:00 


 


WBC   11.07 H  (5.00-10.00)  10^3/uL


 


RBC   4.89  (3.80-5.50)  10^6/uL


 


Hgb   14.0  (12.0-16.0)  g/dL


 


Hct   43.1  (37.0-47.0)  %


 


MCV   88.1  (82.0-92.0)  fL


 


MCH   28.6  (27.0-31.0)  pg


 


MCHC   32.5  (32.0-36.0)  g/dL


 


RDW   14.6 H  (11.5-14.5)  %


 


Plt Count   309  D  (150-400)  10^3/uL


 


MPV   9.4  (7.4-10.4)  fL


 


Immature Gran % (Auto)   2.3  (0.0-5.0)  %


 


Neut % (Auto)   86.5 H  (50.0-70.0)  %


 


Lymph % (Auto)   5.1 L  (20.0-40.0)  %


 


Mono % (Auto)   6.0  (2.0-8.0)  %


 


Eos % (Auto)   0.0 L  (1.0-3.0)  %


 


Baso % (Auto)   0.1  (0.0-1.0)  %


 


Neut # (Auto)   9.58 H  (2.50-7.00)  10^3/uL


 


Lymph # (Auto)   0.56 L  (1.00-4.00)  10^3/uL


 


Mono # (Auto)   0.66  (0.10-0.80)  10^3/uL


 


Eos # (Auto)   0.00 L  (0.10-0.30)  10^3/uL


 


Baso # (Auto)   0.01  (0.00-0.10)  10^3/uL


 


Immature Gran # (Auto)   0.26  (0.00-0.50)  10^3/uL


 


Sodium  143   (136-145)  mmol/L


 


Potassium  3.5   (3.5-5.1)  mmol/L


 


Chloride  111 H   ()  mmol/L


 


Carbon Dioxide  20.4 L   (21.0-32.0)  mmol/L


 


Anion Gap  15.1 H   (5-15)  mmol/L


 


BUN  19 H   (7-18)  mg/dL


 


Creatinine  0.60   (0.51-1.17)  mg/dL


 


Est Cr Clr Drug Dosing  98.69   mL/min


 


Estimated GFR (MDRD)  > 60   mL/min


 


Glucose  149 H   ()  mg/dL


 


Calcium  7.4 L   (8.7-10.3)  mg/dL


 


Total Bilirubin  0.6   (0.2-1.0)  mg/dL


 


AST  94 H   (15-37)  U/L


 


ALT  121 H   (14-63)  U/L


 


Alkaline Phosphatase  184 H   ()  U/L


 


Total Protein  6.1 L   (6.4-8.2)  g/dL


 


Albumin  2.29 L   (3.40-5.00)  g/dL











Result Diagrams: 


                                 10/16/21 10:00





                                 10/16/21 10:00





Sepsis Event Note





- Evaluation


Sepsis Screening Result: No Definite Risk





- Focused Exam


Vital Signs: 


                                   Vital Signs











  Temp Pulse Resp BP Pulse Ox Pulse Ox


 


 10/16/21 09:00       94 L


 


 10/16/21 07:58  97.6 F     92 L 


 


 10/16/21 06:40  97.7 F  81  22 H  122/80  92 L 


 


 10/16/21 03:00  98.3 F  72  20  130/89  94 L 


 


 10/16/21 01:31       88 L


 


 10/15/21 23:00  98.3 F  77  24 H  121/82  92 L 














- Problem List Review


Problem List Initiated/Reviewed/Updated: Yes





- My Orders


Last 24 Hours: 


My Active Orders





10/15/21 16:00


Remdesivir 100 mg   Sodium Chloride 0.9% [Normal Saline] 250 ml IV Q24H 














- Assessment


Assessment:: 





COVID-19 pneumonia.


Hypoxemia


Elevated D-dimer.


Elevated LFTs.








- Plan


Plan:: 





1. INCENTIVE SPIROMETRY


2. PRONING 15MIN X 3/DAY


3. INCREASE ACTIVITY AS TOLERATED


4. WEAN O2 AS TOLERATED


5. CONTINUE ZINC/VIT C/VIT D


6. CONTINUE REMDESIVIR/DECADRON/DUONEBS


7. STOP IV FLUIDS

## 2021-10-17 LAB
ANION GAP SERPL CALC-SCNC: 16.7 MMOL/L (ref 5–15)
CHLORIDE SERPL-SCNC: 110 MMOL/L (ref 98–107)
SODIUM SERPL-SCNC: 145 MMOL/L (ref 136–145)

## 2021-10-17 RX ADMIN — CARBOXYMETHYLCELLULOSE SODIUM PRN DROP: 5 SOLUTION/ DROPS OPHTHALMIC at 08:04

## 2021-10-17 RX ADMIN — ALUMINUM HYDROXIDE, MAGNESIUM HYDROXIDE, AND SIMETHICONE PRN ML: 200; 200; 20 SUSPENSION ORAL at 20:23

## 2021-10-17 RX ADMIN — VITAMIN D, TAB 1000IU (100/BT) SCH MCG: 25 TAB at 08:04

## 2021-10-17 RX ADMIN — DEXAMETHASONE SODIUM PHOSPHATE SCH MG: 10 INJECTION INTRAMUSCULAR; INTRAVENOUS at 08:04

## 2021-10-17 RX ADMIN — Medication SCH MG: at 08:04

## 2021-10-17 NOTE — PCM.PN
- General Info


Date of Service: 10/17/21


Subjective Update: 





PT REPORTS FEELING BETTER TODAY. PT NOT REQUIRING AS MUCH O2. PT WAS ABLE TO 

SHOWER ON HER OWN YESTERDAY AND STATES SHE HAS BEEN MORE ACTIVE AND USING HER 

INCENTIVE SPIROMETER AS DIRECTED. PT WITH A GOOD APPETITE YESTERDAY AND WAS 

HAVING BREAKFAST AT TIME OF EXAM. PT WITHOUT ANY COMPLAINTS AND STATES SHE SLEPT

THE BEST SHE HAS IN DAYS LAST NIGHT. 


Functional Status: Reports: Pain Controlled, Tolerating Diet, Ambulating, 

Incentive Spirometry





- Review of Systems


General: Reports: Fatigue


HEENT: Reports: No Symptoms


Pulmonary: Reports: Cough


Cardiovascular: Reports: No Symptoms


Gastrointestinal: Reports: No Symptoms


Genitourinary: Reports: No Symptoms


Musculoskeletal: Reports: No Symptoms


Skin: Reports: No Symptoms


Neurological: Reports: No Symptoms


Psychiatric: Reports: No Symptoms





- Patient Data


Vitals - Most Recent: 


                                Last Vital Signs











Temp  97.6 F   10/17/21 06:27


 


Pulse  96   10/17/21 06:27


 


Resp  20   10/17/21 06:27


 


BP  128/80   10/17/21 06:27


 


Pulse Ox  92 L  10/17/21 06:27











Weight - Most Recent: 180 lb


I&O - Last 24 Hours: 


                                 Intake & Output











 10/16/21 10/17/21 10/17/21





 22:59 06:59 14:59


 


Intake Total 841 200 


 


Output Total 500 300 


 


Balance 341 -100 











Lab Results Last 24 Hours: 


                         Laboratory Results - last 24 hr











  10/16/21 10/16/21 10/17/21 Range/Units





  10:00 10:00 07:20 


 


WBC   11.07 H  9.82  (5.00-10.00)  10^3/uL


 


RBC   4.89  5.12  (3.80-5.50)  10^6/uL


 


Hgb   14.0  14.5  (12.0-16.0)  g/dL


 


Hct   43.1  44.9  (37.0-47.0)  %


 


MCV   88.1  87.7  (82.0-92.0)  fL


 


MCH   28.6  28.3  (27.0-31.0)  pg


 


MCHC   32.5  32.3  (32.0-36.0)  g/dL


 


RDW   14.6 H  14.5  (11.5-14.5)  %


 


Plt Count   309  D  365  (150-400)  10^3/uL


 


MPV   9.4  9.4  (7.4-10.4)  fL


 


Immature Gran % (Auto)   2.3  2.4  (0.0-5.0)  %


 


Neut % (Auto)   86.5 H  81.3 H  (50.0-70.0)  %


 


Lymph % (Auto)   5.1 L  8.9 L  (20.0-40.0)  %


 


Mono % (Auto)   6.0  7.2  (2.0-8.0)  %


 


Eos % (Auto)   0.0 L  0.1 L  (1.0-3.0)  %


 


Baso % (Auto)   0.1  0.1  (0.0-1.0)  %


 


Neut # (Auto)   9.58 H  7.98 H  (2.50-7.00)  10^3/uL


 


Lymph # (Auto)   0.56 L  0.87 L  (1.00-4.00)  10^3/uL


 


Mono # (Auto)   0.66  0.71  (0.10-0.80)  10^3/uL


 


Eos # (Auto)   0.00 L  0.01 L  (0.10-0.30)  10^3/uL


 


Baso # (Auto)   0.01  0.01  (0.00-0.10)  10^3/uL


 


Immature Gran # (Auto)   0.26  0.24  (0.00-0.50)  10^3/uL


 


Sodium  143    (136-145)  mmol/L


 


Potassium  3.5    (3.5-5.1)  mmol/L


 


Chloride  111 H    ()  mmol/L


 


Carbon Dioxide  20.4 L    (21.0-32.0)  mmol/L


 


Anion Gap  15.1 H    (5-15)  mmol/L


 


BUN  19 H    (7-18)  mg/dL


 


Creatinine  0.60    (0.51-1.17)  mg/dL


 


Est Cr Clr Drug Dosing  98.69    mL/min


 


Estimated GFR (MDRD)  > 60    mL/min


 


Glucose  149 H    ()  mg/dL


 


Calcium  7.4 L    (8.7-10.3)  mg/dL


 


Total Bilirubin  0.6    (0.2-1.0)  mg/dL


 


AST  94 H    (15-37)  U/L


 


ALT  121 H    (14-63)  U/L


 


Alkaline Phosphatase  184 H    ()  U/L


 


Total Protein  6.1 L    (6.4-8.2)  g/dL


 


Albumin  2.29 L    (3.40-5.00)  g/dL











Med Orders - Current: 


                               Current Medications





Acetaminophen (Acetaminophen 325 Mg Tab)  650 mg PO Q4H PRN


   PRN Reason: Pain (Mild 1-3)/fever


   Last Admin: 10/16/21 20:34 Dose:  650 mg


   Documented by: 


Al Hydroxide/Mg Hydroxide (Aluminum Hydroxide/Magnesium Hydroxide/Simethicone 

Susp 30 Ml Cup)  30 ml PO Q6H PRN


   PRN Reason: Heartburn


   Last Admin: 10/16/21 20:52 Dose:  30 ml


   Documented by: 


Albuterol/Ipratropium (Albuterol/Ipratropium 3.0-0.5 Mg/3 Ml Neb Soln)  3 ml NEB

Q4H PRN


   PRN Reason: Shortness Of Breath/wheezing


Artificial Tears (Carboxymethylcellulose Sodium 0.5% Ophth Soln 15 Ml Bottle)  0

ml EYEBOTH ASDIRECTED PRN


   PRN Reason: Dry Eyes


   Last Admin: 10/17/21 08:04 Dose:  1 drop


   Documented by: 


Ascorbic Acid (Ascorbic Acid 500 Mg Tab)  1,000 mg PO BID [facility]


   Last Admin: 10/17/21 08:04 Dose:  1,000 mg


   Documented by: 


Cholecalciferol (Cholecalciferol (Vitamin D3) 25 Mcg Tab)  25 mcg PO DAILY [facility]


   Last Admin: 10/17/21 08:04 Dose:  25 mcg


   Documented by: 


Dexamethasone (Dexamethasone 10 Mg/Ml Sdv)  6 mg IVPUSH DAILY [facility]


   Last Admin: 10/17/21 08:04 Dose:  6 mg


   Documented by: 


Enoxaparin Sodium (Enoxaparin 40 Mg/0.4 Ml Syringe)  40 mg SUBCUT Q12H [facility]


   Last Admin: 10/16/21 23:11 Dose:  Not Given


   Documented by: 


Remdesivir 100 mg/ Sodium (Chloride)  250 mls @ 250 mls/hr IV Q24H [facility]


   Last Admin: 10/16/21 17:03 Dose:  250 mls/hr


   Documented by: 


Ibuprofen (Ibuprofen 600 Mg Tab)  600 mg PO Q6H PRN


   PRN Reason: Pain (mild 1-3)


   Last Admin: 10/16/21 01:14 Dose:  600 mg


   Documented by: 


Morphine Sulfate (Morphine 2 Mg/Ml Syringe)  2 mg IVPUSH Q2H PRN


   PRN Reason: Pain (severe 7-10)


Sodium Chloride (Sodium Chloride 0.9% 10 Ml Syringe)  10 ml FLUSH Q8HR PRN


   PRN Reason: keep vein open


Zinc Gluconate (Zinc (Zinc Gluconate) 50 Mg Tab)  50 mg PO DAILY [facility]


   Last Admin: 10/17/21 08:04 Dose:  50 mg


   Documented by: 





Discontinued Medications





Dexamethasone (Dexamethasone 4 Mg/Ml Sdv)  6 mg IVPUSH ONETIME ONE


   Stop: 10/14/21 13:15


   Last Admin: 10/14/21 13:48 Dose:  6 mg


   Documented by: 


Dexamethasone (Dexamethasone 10 Mg/Ml Sdv)  6 mg IVPUSH ONETIME ONE


   Stop: 10/15/21 16:27


Sodium Chloride (Normal Saline)  1,000 mls @ 999 mls/hr IV .BOLUS ONE


   Stop: 10/14/21 14:13


   Last Admin: 10/14/21 13:25 Dose:  999 mls/hr


   Documented by: 


Sodium Chloride (Normal Saline)  100 mls @ 200 mls/hr IV ASDIRECTED [facility]


   Last Admin: 10/14/21 15:42 Dose:  200 mls/hr


   Documented by: 


Sodium Chloride (Normal Saline)  1,000 mls @ 125 mls/hr IV ASDIRECTED [facility]


   Last Admin: 10/16/21 03:03 Dose:  125 mls/hr


   Documented by: 


Remdesivir 200 mg/ Sodium (Chloride)  250 mls @ 250 mls/hr IV ONETIME ONE


   Stop: 10/14/21 16:27


   Last Admin: 10/14/21 17:14 Dose:  250 mls/hr


   Documented by: 


Iopamidol (Iopamidol 755 Mg/Ml 75 Ml Bottle)  75 ml IVPUSH ONETIME ONE


   Stop: 10/14/21 14:49


   Last Admin: 10/14/21 15:42 Dose:  75 ml


   Documented by: 


Ketorolac Tromethamine (Ketorolac 30 Mg/Ml Sdv)  30 mg IVPUSH ONETIME ONE


   Stop: 10/14/21 13:43


   Last Admin: 10/14/21 13:48 Dose:  30 mg


   Documented by: 


Ketorolac Tromethamine (Ketorolac 30 Mg/Ml Sdv) Confirm Administered Dose 30 mg 

.ROUTE .STK-MED ONE


   Stop: 10/14/21 13:44


   Last Admin: 10/14/21 13:49 Dose:  Not Given


   Documented by: 











- Exam


Quality Assessment: Supplemental Oxygen


General: Alert, Oriented


HEENT: Pupils Equal, Pupils Reactive, EOMI, Mucous Membr. Moist/Pink


Neck: Supple


Lungs: Normal Respiratory Effort, Crackles


Cardiovascular: Regular Rate, Regular Rhythm


GI/Abdominal Exam: Normal Bowel Sounds, Soft, Non-Tender, No Organomegaly, No 

Distention, No Abnormal Bruit, No Mass, Pelvis Stable


Back Exam: Normal Inspection, Full Range of Motion


Extremities: Normal Inspection, Normal Range of Motion, Non-Tender, No Pedal 

Edema, Normal Capillary Refill


Skin: Warm, Dry, Intact


Neurological: No New Focal Deficit


Psy/Mental Status: Alert, Normal Affect, Normal Mood





- Patient Data


Lab Results Last 24 hrs: 


                         Laboratory Results - last 24 hr











  10/16/21 10/16/21 10/17/21 Range/Units





  10:00 10:00 07:20 


 


WBC   11.07 H  9.82  (5.00-10.00)  10^3/uL


 


RBC   4.89  5.12  (3.80-5.50)  10^6/uL


 


Hgb   14.0  14.5  (12.0-16.0)  g/dL


 


Hct   43.1  44.9  (37.0-47.0)  %


 


MCV   88.1  87.7  (82.0-92.0)  fL


 


MCH   28.6  28.3  (27.0-31.0)  pg


 


MCHC   32.5  32.3  (32.0-36.0)  g/dL


 


RDW   14.6 H  14.5  (11.5-14.5)  %


 


Plt Count   309  D  365  (150-400)  10^3/uL


 


MPV   9.4  9.4  (7.4-10.4)  fL


 


Immature Gran % (Auto)   2.3  2.4  (0.0-5.0)  %


 


Neut % (Auto)   86.5 H  81.3 H  (50.0-70.0)  %


 


Lymph % (Auto)   5.1 L  8.9 L  (20.0-40.0)  %


 


Mono % (Auto)   6.0  7.2  (2.0-8.0)  %


 


Eos % (Auto)   0.0 L  0.1 L  (1.0-3.0)  %


 


Baso % (Auto)   0.1  0.1  (0.0-1.0)  %


 


Neut # (Auto)   9.58 H  7.98 H  (2.50-7.00)  10^3/uL


 


Lymph # (Auto)   0.56 L  0.87 L  (1.00-4.00)  10^3/uL


 


Mono # (Auto)   0.66  0.71  (0.10-0.80)  10^3/uL


 


Eos # (Auto)   0.00 L  0.01 L  (0.10-0.30)  10^3/uL


 


Baso # (Auto)   0.01  0.01  (0.00-0.10)  10^3/uL


 


Immature Gran # (Auto)   0.26  0.24  (0.00-0.50)  10^3/uL


 


Sodium  143    (136-145)  mmol/L


 


Potassium  3.5    (3.5-5.1)  mmol/L


 


Chloride  111 H    ()  mmol/L


 


Carbon Dioxide  20.4 L    (21.0-32.0)  mmol/L


 


Anion Gap  15.1 H    (5-15)  mmol/L


 


BUN  19 H    (7-18)  mg/dL


 


Creatinine  0.60    (0.51-1.17)  mg/dL


 


Est Cr Clr Drug Dosing  98.69    mL/min


 


Estimated GFR (MDRD)  > 60    mL/min


 


Glucose  149 H    ()  mg/dL


 


Calcium  7.4 L    (8.7-10.3)  mg/dL


 


Total Bilirubin  0.6    (0.2-1.0)  mg/dL


 


AST  94 H    (15-37)  U/L


 


ALT  121 H    (14-63)  U/L


 


Alkaline Phosphatase  184 H    ()  U/L


 


Total Protein  6.1 L    (6.4-8.2)  g/dL


 


Albumin  2.29 L    (3.40-5.00)  g/dL











Result Diagrams: 


                                 10/17/21 07:20





                                 10/16/21 10:00





Sepsis Event Note





- Evaluation


Sepsis Screening Result: No Definite Risk





- Focused Exam


Vital Signs: 


                                   Vital Signs











  Temp Pulse Resp BP Pulse Ox


 


 10/17/21 06:27  97.6 F  96  20  128/80  92 L


 


 10/17/21 03:00  97.6 F  75  20  117/78  93 L


 


 10/16/21 22:00   78  20   95














- Problem List Review


Problem List Initiated/Reviewed/Updated: Yes





- Assessment


Assessment:: 





COVID-19 pneumonia.


Hypoxemia


Elevated D-dimer.


Elevated LFTs.








- Plan


Plan:: 





1. INCENTIVE SPIROMETRY


2. PRONING 15MIN X 3/DAY


3. INCREASE ACTIVITY AS TOLERATED


4. WEAN O2 AS TOLERATED


5. CONTINUE ZINC/VIT C/VIT D


6. CONTINUE REMDESIVIR/DECADRON/DUONEBS

## 2021-10-18 LAB
ANION GAP SERPL CALC-SCNC: 12.7 MMOL/L (ref 5–15)
CHLORIDE SERPL-SCNC: 109 MMOL/L (ref 98–107)
SODIUM SERPL-SCNC: 145 MMOL/L (ref 136–145)

## 2021-10-18 RX ADMIN — DEXAMETHASONE SODIUM PHOSPHATE SCH MG: 10 INJECTION INTRAMUSCULAR; INTRAVENOUS at 09:05

## 2021-10-18 RX ADMIN — ALUMINUM HYDROXIDE, MAGNESIUM HYDROXIDE, AND SIMETHICONE PRN ML: 200; 200; 20 SUSPENSION ORAL at 22:58

## 2021-10-18 RX ADMIN — Medication SCH MG: at 09:05

## 2021-10-18 RX ADMIN — VITAMIN D, TAB 1000IU (100/BT) SCH MCG: 25 TAB at 09:05

## 2021-10-18 NOTE — PCM.PN
- General Info


Date of Service: 10/18/21


Functional Status: Reports: Pain Controlled, Tolerating Diet, Ambulating, 

Urinating, Incentive Spirometry





- Review of Systems


General: Reports: No Symptoms


HEENT: Reports: No Symptoms


Pulmonary: Reports: Cough


Cardiovascular: Reports: No Symptoms


Gastrointestinal: Reports: No Symptoms


Genitourinary: Reports: No Symptoms


Musculoskeletal: Reports: No Symptoms


Skin: Reports: No Symptoms


Neurological: Reports: No Symptoms


Psychiatric: Reports: No Symptoms





- Patient Data


Vitals - Most Recent: 


                                Last Vital Signs











Temp  97.9 F   10/18/21 06:45


 


Pulse  95   10/18/21 06:45


 


Resp  24 H  10/18/21 06:45


 


BP  126/74   10/18/21 06:45


 


Pulse Ox  92 L  10/18/21 06:45











Weight - Most Recent: 180 lb


I&O - Last 24 Hours: 


                                 Intake & Output











 10/17/21 10/18/21 10/18/21





 22:59 06:59 14:59


 


Intake Total 680 200 


 


Output Total  600 


 


Balance 680 -400 











Lab Results Last 24 Hours: 


                         Laboratory Results - last 24 hr











  10/17/21 10/18/21 Range/Units





  07:20 07:25 


 


WBC   7.48  (5.00-10.00)  10^3/uL


 


RBC   4.78  (3.80-5.50)  10^6/uL


 


Hgb   13.8  (12.0-16.0)  g/dL


 


Hct   41.8  (37.0-47.0)  %


 


MCV   87.4  (82.0-92.0)  fL


 


MCH   28.9  (27.0-31.0)  pg


 


MCHC   33.0  (32.0-36.0)  g/dL


 


RDW   14.3  (11.5-14.5)  %


 


Plt Count   312  (150-400)  10^3/uL


 


MPV   9.6  (7.4-10.4)  fL


 


Immature Gran % (Auto)   4.9  (0.0-5.0)  %


 


Neut % (Auto)   75.3 H  (50.0-70.0)  %


 


Lymph % (Auto)   10.4 L  (20.0-40.0)  %


 


Mono % (Auto)   8.7 H  (2.0-8.0)  %


 


Eos % (Auto)   0.4 L  (1.0-3.0)  %


 


Baso % (Auto)   0.3  (0.0-1.0)  %


 


Neut # (Auto)   5.63  (2.50-7.00)  10^3/uL


 


Lymph # (Auto)   0.78 L  (1.00-4.00)  10^3/uL


 


Mono # (Auto)   0.65  (0.10-0.80)  10^3/uL


 


Eos # (Auto)   0.03 L  (0.10-0.30)  10^3/uL


 


Baso # (Auto)   0.02  (0.00-0.10)  10^3/uL


 


Immature Gran # (Auto)   0.37  (0.00-0.50)  10^3/uL


 


Sodium  145   (136-145)  mmol/L


 


Potassium  4.1   (3.5-5.1)  mmol/L


 


Chloride  110 H   ()  mmol/L


 


Carbon Dioxide  22.4   (21.0-32.0)  mmol/L


 


Anion Gap  16.7 H   (5-15)  mmol/L


 


BUN  16   (7-18)  mg/dL


 


Creatinine  0.69   (0.51-1.17)  mg/dL


 


Est Cr Clr Drug Dosing  85.82   mL/min


 


Estimated GFR (MDRD)  > 60   mL/min


 


Glucose  97   ()  mg/dL


 


Calcium  7.8 L   (8.7-10.3)  mg/dL


 


Total Bilirubin  0.9   (0.2-1.0)  mg/dL


 


AST  109 H   (15-37)  U/L


 


ALT  163 H   (14-63)  U/L


 


Alkaline Phosphatase  196 H   ()  U/L


 


Total Protein  6.0 L   (6.4-8.2)  g/dL


 


Albumin  2.62 L   (3.40-5.00)  g/dL











Med Orders - Current: 


                               Current Medications





Acetaminophen (Acetaminophen 325 Mg Tab)  650 mg PO Q4H PRN


   PRN Reason: Pain (Mild 1-3)/fever


   Last Admin: 10/16/21 20:34 Dose:  650 mg


   Documented by: 


Al Hydroxide/Mg Hydroxide (Aluminum Hydroxide/Magnesium Hydroxide/Simethicone 

Susp 30 Ml Cup)  30 ml PO Q6H PRN


   PRN Reason: Heartburn


   Last Admin: 10/17/21 20:23 Dose:  30 ml


   Documented by: 


Albuterol/Ipratropium (Albuterol/Ipratropium 3.0-0.5 Mg/3 Ml Neb Soln)  3 ml NEB

Q4H PRN


   PRN Reason: Shortness Of Breath/wheezing


   Last Admin: 10/17/21 14:43 Dose:  3 ml


   Documented by: 


Artificial Tears (Carboxymethylcellulose Sodium 0.5% Ophth Soln 15 Ml Bottle)  0

ml EYEBOTH ASDIRECTED PRN


   PRN Reason: Dry Eyes


   Last Admin: 10/17/21 08:04 Dose:  1 drop


   Documented by: 


Ascorbic Acid (Ascorbic Acid 500 Mg Tab)  1,000 mg PO BID [facility]


   Last Admin: 10/17/21 20:22 Dose:  1,000 mg


   Documented by: 


Cholecalciferol (Cholecalciferol (Vitamin D3) 25 Mcg Tab)  25 mcg PO DAILY [facility]


   Last Admin: 10/17/21 08:04 Dose:  25 mcg


   Documented by: 


Dexamethasone (Dexamethasone 10 Mg/Ml Sdv)  6 mg IVPUSH DAILY [facility]


   Last Admin: 10/17/21 08:04 Dose:  6 mg


   Documented by: 


Enoxaparin Sodium (Enoxaparin 40 Mg/0.4 Ml Syringe)  40 mg SUBCUT Q12H [facility]


   Last Admin: 10/17/21 22:54 Dose:  Not Given


   Documented by: 


Remdesivir 100 mg/ Sodium (Chloride)  250 mls @ 250 mls/hr IV Q24H [facility]


   Last Admin: 10/17/21 16:15 Dose:  Not Given


   Documented by: 


Ibuprofen (Ibuprofen 600 Mg Tab)  600 mg PO Q6H PRN


   PRN Reason: Pain (mild 1-3)


   Last Admin: 10/16/21 01:14 Dose:  600 mg


   Documented by: 


Morphine Sulfate (Morphine 2 Mg/Ml Syringe)  2 mg IVPUSH Q2H PRN


   PRN Reason: Pain (severe 7-10)


Sodium Chloride (Sodium Chloride 0.9% 10 Ml Syringe)  10 ml FLUSH Q8HR PRN


   PRN Reason: keep vein open


Zinc Gluconate (Zinc (Zinc Gluconate) 50 Mg Tab)  50 mg PO DAILY [facility]


   Last Admin: 10/17/21 08:04 Dose:  50 mg


   Documented by: 





Discontinued Medications





Dexamethasone (Dexamethasone 4 Mg/Ml Sdv)  6 mg IVPUSH ONETIME ONE


   Stop: 10/14/21 13:15


   Last Admin: 10/14/21 13:48 Dose:  6 mg


   Documented by: 


Dexamethasone (Dexamethasone 10 Mg/Ml Sdv)  6 mg IVPUSH ONETIME ONE


   Stop: 10/15/21 16:27


Sodium Chloride (Normal Saline)  1,000 mls @ 999 mls/hr IV .BOLUS ONE


   Stop: 10/14/21 14:13


   Last Admin: 10/14/21 13:25 Dose:  999 mls/hr


   Documented by: 


Sodium Chloride (Normal Saline)  100 mls @ 200 mls/hr IV ASDIRECTED [facility]


   Last Admin: 10/14/21 15:42 Dose:  200 mls/hr


   Documented by: 


Sodium Chloride (Normal Saline)  1,000 mls @ 125 mls/hr IV ASDIRECTED [facility]


   Last Admin: 10/16/21 03:03 Dose:  125 mls/hr


   Documented by: 


Remdesivir 200 mg/ Sodium (Chloride)  250 mls @ 250 mls/hr IV ONETIME ONE


   Stop: 10/14/21 16:27


   Last Admin: 10/14/21 17:14 Dose:  250 mls/hr


   Documented by: 


Iopamidol (Iopamidol 755 Mg/Ml 75 Ml Bottle)  75 ml IVPUSH ONETIME ONE


   Stop: 10/14/21 14:49


   Last Admin: 10/14/21 15:42 Dose:  75 ml


   Documented by: 


Ketorolac Tromethamine (Ketorolac 30 Mg/Ml Sdv)  30 mg IVPUSH ONETIME ONE


   Stop: 10/14/21 13:43


   Last Admin: 10/14/21 13:48 Dose:  30 mg


   Documented by: 


Ketorolac Tromethamine (Ketorolac 30 Mg/Ml Sdv) Confirm Administered Dose 30 mg 

.ROUTE .STK-MED ONE


   Stop: 10/14/21 13:44


   Last Admin: 10/14/21 13:49 Dose:  Not Given


   Documented by: 











- Exam


Quality Assessment: Supplemental Oxygen, DVT Prophylaxis


General: Alert, Oriented


HEENT: Pupils Equal, Pupils Reactive, EOMI, Mucous Membr. Moist/Pink


Neck: Supple


Lungs: Clear to Auscultation, Normal Respiratory Effort


Cardiovascular: Regular Rate, Regular Rhythm


GI/Abdominal Exam: Normal Bowel Sounds, Soft, Non-Tender, No Organomegaly, No 

Distention, No Mass, Pelvis Stable


Back Exam: Normal Inspection, Full Range of Motion


Extremities: Normal Inspection, Normal Range of Motion, Non-Tender, No Pedal 

Edema, Normal Capillary Refill


Skin: Warm, Dry, Intact


Wound/Incisions: Healing Well


Neurological: No New Focal Deficit


Psy/Mental Status: Alert, Normal Affect, Normal Mood





- Patient Data


Lab Results Last 24 hrs: 


                         Laboratory Results - last 24 hr











  10/17/21 10/18/21 Range/Units





  07:20 07:25 


 


WBC   7.48  (5.00-10.00)  10^3/uL


 


RBC   4.78  (3.80-5.50)  10^6/uL


 


Hgb   13.8  (12.0-16.0)  g/dL


 


Hct   41.8  (37.0-47.0)  %


 


MCV   87.4  (82.0-92.0)  fL


 


MCH   28.9  (27.0-31.0)  pg


 


MCHC   33.0  (32.0-36.0)  g/dL


 


RDW   14.3  (11.5-14.5)  %


 


Plt Count   312  (150-400)  10^3/uL


 


MPV   9.6  (7.4-10.4)  fL


 


Immature Gran % (Auto)   4.9  (0.0-5.0)  %


 


Neut % (Auto)   75.3 H  (50.0-70.0)  %


 


Lymph % (Auto)   10.4 L  (20.0-40.0)  %


 


Mono % (Auto)   8.7 H  (2.0-8.0)  %


 


Eos % (Auto)   0.4 L  (1.0-3.0)  %


 


Baso % (Auto)   0.3  (0.0-1.0)  %


 


Neut # (Auto)   5.63  (2.50-7.00)  10^3/uL


 


Lymph # (Auto)   0.78 L  (1.00-4.00)  10^3/uL


 


Mono # (Auto)   0.65  (0.10-0.80)  10^3/uL


 


Eos # (Auto)   0.03 L  (0.10-0.30)  10^3/uL


 


Baso # (Auto)   0.02  (0.00-0.10)  10^3/uL


 


Immature Gran # (Auto)   0.37  (0.00-0.50)  10^3/uL


 


Sodium  145   (136-145)  mmol/L


 


Potassium  4.1   (3.5-5.1)  mmol/L


 


Chloride  110 H   ()  mmol/L


 


Carbon Dioxide  22.4   (21.0-32.0)  mmol/L


 


Anion Gap  16.7 H   (5-15)  mmol/L


 


BUN  16   (7-18)  mg/dL


 


Creatinine  0.69   (0.51-1.17)  mg/dL


 


Est Cr Clr Drug Dosing  85.82   mL/min


 


Estimated GFR (MDRD)  > 60   mL/min


 


Glucose  97   ()  mg/dL


 


Calcium  7.8 L   (8.7-10.3)  mg/dL


 


Total Bilirubin  0.9   (0.2-1.0)  mg/dL


 


AST  109 H   (15-37)  U/L


 


ALT  163 H   (14-63)  U/L


 


Alkaline Phosphatase  196 H   ()  U/L


 


Total Protein  6.0 L   (6.4-8.2)  g/dL


 


Albumin  2.62 L   (3.40-5.00)  g/dL











Result Diagrams: 


                                 10/18/21 07:25





                                 10/17/21 07:20





Sepsis Event Note





- Evaluation


Sepsis Screening Result: No Definite Risk





- Focused Exam


Vital Signs: 


                                   Vital Signs











  Temp Pulse Resp BP Pulse Ox


 


 10/18/21 06:45  97.9 F  95  24 H  126/74  92 L


 


 10/18/21 03:00  98.2 F  81  24 H  125/81  93 L


 


 10/17/21 21:52  97.7 F  85  20  136/92 H  94 L














- Problem List Review


Problem List Initiated/Reviewed/Updated: Yes





- Assessment


Assessment:: 





COVID-19 pneumonia.


Hypoxemia- IMPROVING- PT ON 1.5L NC


Elevated D-dimer.


Elevated LFTs.








- Plan


Plan:: 





1. INCENTIVE SPIROMETRY


2. PRONING 15MIN X 3/DAY


3. INCREASE ACTIVITY AS TOLERATED


4. WEAN O2 AS TOLERATED


5. CONTINUE ZINC/VIT C/VIT D


6. CONTINUE REMDESIVIR- LAST DOSE TOMORROW- DECADRON/DUONEBS


7. PLAN TO DISCHARGE IN AM- ARRANGE SUPPLEMENTAL O2 AS NEEDED

## 2021-10-19 VITALS — SYSTOLIC BLOOD PRESSURE: 120 MMHG | DIASTOLIC BLOOD PRESSURE: 80 MMHG | HEART RATE: 78 BPM

## 2021-10-19 LAB
ANION GAP SERPL CALC-SCNC: 12.8 MMOL/L (ref 5–15)
CHLORIDE SERPL-SCNC: 107 MMOL/L (ref 98–107)
SODIUM SERPL-SCNC: 144 MMOL/L (ref 136–145)

## 2021-10-19 RX ADMIN — VITAMIN D, TAB 1000IU (100/BT) SCH MCG: 25 TAB at 08:47

## 2021-10-19 RX ADMIN — Medication SCH MG: at 08:47

## 2021-10-19 NOTE — [id number].DCSUM1
**Discharge Summary





- Hospital Course


Free Text/Narrative:: 


Admission Date:  10/14/2021


Discharge Date:  10/19/2021





Disposition:  Home, Self Care





CODE STATUS:  Full Code





Admission Diagnoses:


Covid pneumonia with hypoxemia





Covid Hepatitis





Discharge Diagnoses:


Covid Pneumonia, hypoxemia resolved





Covid hepatitis, improving slowly





Secondary Diagnoses:


Acid Reflux





Hospital Course:





Paulina was hospitalized after presentation to the ER for difficulty breathing 

and O2 sats of 85% on room air in the ER.  She tested positive for COVID on 10/5

after thinking she had a sinus infection for 2-3 days.  She was not a candidate 

for monoclonal antibody infusion.  She did conservative treatment at home but 

developed "facial swelling and swollen glands" as well as poor appetite, fatigue

and difficulty taking a deep breath.  She had a telemedicine visit on 10/14 

where it was recommended she present to the ER.  In the ER, sats were 85% on RA 

and improved with oxygen.  She was admitted for further therapy and for 

Remdesivir.  She at times required up to 8L of O2 via NC.  At the time of 

discharge she doesn't feel back to baseline but overall feels better and no 

longer requires O2.  She is going to be discharged to home.  She was treated 

with Remdesivir x 5 days, dexamethasone 6 mg IV x 5 days, Zinc 50 mg PO daily, 

Vit D 25 mcg PO daily and Vit C 1,000 mg PO BID.  She was on enoxaparin 40 mg 

subcut BID while hospitalized.  She had an elevated D-dimer and CT chest was 

done which was negative for PE.  Discharge to home with no new medications.











Modified [place] Scale: No Signif.Disability Despite Sympt.Able to Carry Out 

Usual Act./Duties


Modified [place] Scale Score: 1





- Discharge Data


Discharge Date: 10/19/21


Discharge Disposition: Home, Self-Care 01


Condition: Good





- Referral to Home Health


Primary Care Physician: 


Enedleia Jovel MD








- Patient Summary/Data


Recommended Follow-up Testing/Procedures: 


Follow-up with Dr. Palmer in clinic in 1 week with repeat hepatic function 

panel.








- Patient Instructions


Diet: Regular Diet as Tolerated


Activity: Rest and Relax Today





- Discharge Plan


*PRESCRIPTION DRUG MONITORING PROGRAM REVIEWED*: Not Applicable


*COPY OF PRESCRIPTION DRUG MONITORING REPORT IN PATIENT IRVIN: Not Applicable


Home Medications: 


                                    Home Meds





Famotidine 20 mg PO DAILY 10/14/21 [History]








Forms:  ED Department Discharge


Referrals: 


Enedelia Jovel MD [Primary Care Provider] - 





- Discharge Summary/Plan Comment


DC Time >30 min.: No


Total # of Minutes for Discharge Time: 





17





- General Info


Date of Service: 10/19/21


Admission Dx/Problem (Free Text: 





1.





- Patient Data


Vitals - Most Recent: 


                                Last Vital Signs











Temp  97.8 F   10/19/21 07:00


 


Pulse  78   10/19/21 07:00


 


Resp  20   10/19/21 07:00


 


BP  120/80   10/19/21 07:00


 


Pulse Ox  92 L  10/19/21 07:00











Weight - Most Recent: 180 lb


I&O - Last 24 hours: 


                                 Intake & Output











 10/18/21 10/19/21 10/19/21





 22:59 06:59 14:59


 


Intake Total 800 150 


 


Balance 800 150 











Lab Results - Last 24 hrs: 


                         Laboratory Results - last 24 hr











  10/19/21 10/19/21 Range/Units





  07:45 07:45 


 


WBC  5.87   (5.00-10.00)  10^3/uL


 


RBC  4.95   (3.80-5.50)  10^6/uL


 


Hgb  13.9   (12.0-16.0)  g/dL


 


Hct  43.0   (37.0-47.0)  %


 


MCV  86.9   (82.0-92.0)  fL


 


MCH  28.1   (27.0-31.0)  pg


 


MCHC  32.3   (32.0-36.0)  g/dL


 


RDW  14.3   (11.5-14.5)  %


 


Plt Count  343   (150-400)  10^3/uL


 


MPV  9.8   (7.4-10.4)  fL


 


Add Manual Diff  Yes   


 


Neutrophils % (Manual)  72 H   (50-70)  %


 


Lymphocytes % (Manual)  16 L   (20-40)  %


 


Monocytes % (Manual)  11 H   (2-8)  %


 


Eosinophils % (Manual)  1   (1-3)  %


 


Platelet Estimate  Adequate   


 


Sodium   144  (136-145)  mmol/L


 


Potassium   3.6  (3.5-5.1)  mmol/L


 


Chloride   107  ()  mmol/L


 


Carbon Dioxide   27.8  (21.0-32.0)  mmol/L


 


Anion Gap   12.8  (5-15)  mmol/L


 


BUN   15  (7-18)  mg/dL


 


Creatinine   0.65  (0.51-1.17)  mg/dL


 


Est Cr Clr Drug Dosing   91.10  mL/min


 


Estimated GFR (MDRD)   > 60  mL/min


 


Glucose   105  ()  mg/dL


 


Calcium   7.8 L  (8.7-10.3)  mg/dL


 


Total Bilirubin   0.9  (0.2-1.0)  mg/dL


 


AST   50 H  (15-37)  U/L


 


ALT   151 H  (14-63)  U/L


 


Alkaline Phosphatase   166 H  ()  U/L


 


Total Protein   5.7 L  (6.4-8.2)  g/dL


 


Albumin   2.55 L  (3.40-5.00)  g/dL











Med Orders - Current: 


                               Current Medications





Acetaminophen (Acetaminophen 325 Mg Tab)  650 mg PO Q4H PRN


   PRN Reason: Pain (Mild 1-3)/fever


   Last Admin: 10/16/21 20:34 Dose:  650 mg


   Documented by: 


Al Hydroxide/Mg Hydroxide (Aluminum Hydroxide/Magnesium Hydroxide/Simethicone 

Susp 30 Ml Cup)  30 ml PO Q6H PRN


   PRN Reason: Heartburn


   Last Admin: 10/18/21 22:58 Dose:  30 ml


   Documented by: 


Albuterol/Ipratropium (Albuterol/Ipratropium 3.0-0.5 Mg/3 Ml Neb Soln)  3 ml NEB

Q4H PRN


   PRN Reason: Shortness Of Breath/wheezing


   Last Admin: 10/17/21 14:43 Dose:  3 ml


   Documented by: 


Artificial Tears (Carboxymethylcellulose Sodium 0.5% Ophth Soln 15 Ml Bottle)  0

ml EYEBOTH ASDIRECTED PRN


   PRN Reason: Dry Eyes


   Last Admin: 10/17/21 08:04 Dose:  1 drop


   Documented by: 


Ascorbic Acid (Ascorbic Acid 500 Mg Tab)  1,000 mg PO BID [facility]


   Last Admin: 10/19/21 08:46 Dose:  1,000 mg


   Documented by: 


Cholecalciferol (Cholecalciferol (Vitamin D3) 25 Mcg Tab)  25 mcg PO DAILY [facility]


   Last Admin: 10/19/21 08:47 Dose:  25 mcg


   Documented by: 


Enoxaparin Sodium (Enoxaparin 40 Mg/0.4 Ml Syringe)  40 mg SUBCUT Q12H [facility]


   Last Admin: 10/18/21 22:52 Dose:  40 mg


   Documented by: 


Ibuprofen (Ibuprofen 600 Mg Tab)  600 mg PO Q6H PRN


   PRN Reason: Pain (mild 1-3)


   Last Admin: 10/16/21 01:14 Dose:  600 mg


   Documented by: 


Morphine Sulfate (Morphine 2 Mg/Ml Syringe)  2 mg IVPUSH Q2H PRN


   PRN Reason: Pain (severe 7-10)


Sodium Chloride (Sodium Chloride 0.9% 10 Ml Syringe)  10 ml FLUSH Q8HR PRN


   PRN Reason: keep vein open


Zinc Gluconate (Zinc (Zinc Gluconate) 50 Mg Tab)  50 mg PO DAILY [facility]


   Last Admin: 10/19/21 08:47 Dose:  50 mg


   Documented by: 





Discontinued Medications





Dexamethasone (Dexamethasone 4 Mg/Ml Sdv)  6 mg IVPUSH ONETIME ONE


   Stop: 10/14/21 13:15


   Last Admin: 10/14/21 13:48 Dose:  6 mg


   Documented by: 


Dexamethasone (Dexamethasone 10 Mg/Ml Sdv)  6 mg IVPUSH ONETIME ONE


   Stop: 10/15/21 16:27


Dexamethasone (Dexamethasone 10 Mg/Ml Sdv)  6 mg IVPUSH DAILY [facility]


   Last Admin: 10/18/21 09:05 Dose:  6 mg


   Documented by: 


Sodium Chloride (Normal Saline)  1,000 mls @ 999 mls/hr IV .BOLUS ONE


   Stop: 10/14/21 14:13


   Last Admin: 10/14/21 13:25 Dose:  999 mls/hr


   Documented by: 


Sodium Chloride (Normal Saline)  [facility]


   Last Admin: 10/14/21 15:42 Dose:  200 mls/hr


   Documented by: 


Sodium Chloride (Normal Saline)  1,000 mls @ 125 [facility]


   Last Admin: 10/16/21 03:03 Dose:  125 mls/hr


   Documented by: 


Remdesivir 200 mg/ Sodium (Chloride)  250 mls @ 250 mls/hr IV ONETIME ONE


   Stop: 10/14/21 16:27


   Last Admin: 10/14/21 17:14 Dose:  250 mls/hr


   Documented by: 


Remdesivir 100 mg/ Sodium (Chloride)  250 mls @ 250 mls/hr IV Q24H [facility]


   Last Admin: 10/18/21 16:22 Dose:  250 mls/hr


   Documented by: 


Iopamidol (Iopamidol 755 Mg/Ml 75 Ml Bottle)  75 ml IVPUSH ONETIME ONE


   Stop: 10/14/21 14:49


   Last Admin: 10/14/21 15:42 Dose:  75 ml


   Documented by: 


Ketorolac Tromethamine (Ketorolac 30 Mg/Ml Sdv)  30 mg IVPUSH ONETIME ONE


   Stop: 10/14/21 13:43


   Last Admin: 10/14/21 13:48 Dose:  30 mg


   Documented by: 


Ketorolac Tromethamine (Ketorolac 30 Mg/Ml Sdv) Confirm Administered Dose 30 mg 

.ROUTE .STK-MED ONE


   Stop: 10/14/21 13:44


   Last Admin: 10/14/21 13:49 Dose:  Not Given


   Documented by: 











- Exam


General: Reports: Alert, Oriented, Cooperative, No Acute Distress


Neck: Reports: Supple, Trachea Midline


Lungs: Reports: Clear to Auscultation, Normal Respiratory Effort


Cardiovascular: Reports: Regular Rate, Regular Rhythm, No Murmurs